# Patient Record
Sex: FEMALE | Race: WHITE | NOT HISPANIC OR LATINO | Employment: UNEMPLOYED | URBAN - METROPOLITAN AREA
[De-identification: names, ages, dates, MRNs, and addresses within clinical notes are randomized per-mention and may not be internally consistent; named-entity substitution may affect disease eponyms.]

---

## 2021-08-04 ENCOUNTER — VBI (OUTPATIENT)
Dept: ADMINISTRATIVE | Facility: OTHER | Age: 56
End: 2021-08-04

## 2021-08-04 NOTE — TELEPHONE ENCOUNTER
Upon review of the In Basket request we were able to locate, review, and update the patient chart as requested for Mammogram and Pap Smear (HPV) aka Cervical Cancer Screening  Any additional questions or concerns should be emailed to the Practice Liaisons via [email or web address] email, please do not reply via In Basket      Thank you  Ethyl Nandini

## 2021-10-05 ENCOUNTER — ANNUAL EXAM (OUTPATIENT)
Dept: OBGYN CLINIC | Facility: CLINIC | Age: 56
End: 2021-10-05
Payer: COMMERCIAL

## 2021-10-05 VITALS
DIASTOLIC BLOOD PRESSURE: 70 MMHG | BODY MASS INDEX: 22.18 KG/M2 | SYSTOLIC BLOOD PRESSURE: 110 MMHG | WEIGHT: 138 LBS | HEIGHT: 66 IN

## 2021-10-05 DIAGNOSIS — Z12.31 ENCOUNTER FOR SCREENING MAMMOGRAM FOR BREAST CANCER: ICD-10-CM

## 2021-10-05 DIAGNOSIS — N95.2 VAGINAL ATROPHY: ICD-10-CM

## 2021-10-05 DIAGNOSIS — Z01.419 ENCOUNTER FOR GYNECOLOGICAL EXAMINATION (GENERAL) (ROUTINE) WITHOUT ABNORMAL FINDINGS: Primary | ICD-10-CM

## 2021-10-05 PROCEDURE — 99396 PREV VISIT EST AGE 40-64: CPT | Performed by: OBSTETRICS & GYNECOLOGY

## 2021-10-05 RX ORDER — ESTRADIOL 0.1 MG/G
1 CREAM VAGINAL 2 TIMES WEEKLY
Qty: 42.5 G | Refills: 1 | Status: SHIPPED | OUTPATIENT
Start: 2021-10-07

## 2022-09-06 DIAGNOSIS — Z12.31 ENCOUNTER FOR SCREENING MAMMOGRAM FOR BREAST CANCER: ICD-10-CM

## 2022-10-19 NOTE — PROGRESS NOTES
Assessment/Plan:    Encounter for gynecological examination (general) (routine) without abnormal findings  Monthly cycles continue every 28 days; flow 2 days; spots 10 days  Spotted most of month in July and August    Last work up 10/2020 with normal u/s and endometrial biopsy  Continues with significant dyspareunia; GI doctor forbade estrogen cream due to liver cysts  At times not painful, then significant burning  Thinks may have to do with pH  Discussed RepHresh gel, option of dilators  Normal breast exam, mammo order given, last 22  Normal pelvic exam with minimal atrophy  Cervix stenotic  Pap done  U/s ordered, RTO 4 wks for possible endometrial biopsy (will need cytotec**)  Colonoscopy 2018, due        Diagnoses and all orders for this visit:    Encounter for gynecological examination (general) (routine) without abnormal findings    Encounter for screening mammogram for malignant neoplasm of breast  -     Mammo screening bilateral w 3d & cad; Future    Screening for malignant neoplasm of the cervix  -     IGP, Aptima HPV, Rfx 16/18,45    Irregular bleeding  -     US pelvis complete w transvaginal; Future          Subjective:      Patient ID: Alex Hodges is a 56 y o  female  HPI Here for well check  The following portions of the patient's history were reviewed and updated as appropriate:   She  has a past medical history of Abnormal ECG (Oct 2018), Abnormal Pap smear of cervix (mid to late 80's), Cancer ([address] ) (,  plus others), Female infertility,  3 para 3, Hemorrhoids, History of endometrial biopsy (10/2020), History of infertility, History of pelvic ultrasound (10/05/2020), Kidney cysts, Liver, polycystic, Low hemoglobin, Migraine, Papanicolaou smear (2018), PCOS (polycystic ovarian syndrome), Polycystic ovary syndrome (mid to late 80's), Skin cancer (melanoma) ([address] ), Subarachnoid cyst, and Varicella    She   Patient Active Problem List    Diagnosis Date Noted   • Liver cyst - NO ESTROGEN 10/20/2022   • Encounter for gynecological examination (general) (routine) without abnormal findings 10/05/2021     She  has a past surgical history that includes Hysteroscopy w/ polypectomy (10/2018); Appendectomy (); Gynecologic cryosurgery ();  section; Tonsillectomy; Excision basal cell carcinoma (); Cosmetic surgery (); Sinus surgery (); Tubal ligation (); Excisional hemorrhoidectomy (); Mammo (historical) (Bilateral, 2021); and Colonoscopy (2018)  Her family history includes Breast cancer (age of onset: 62) in her mother; Cancer in her father; Diabetes in her father; Other in her mother; Ovarian cancer in her mother; Pancreatic cancer in her paternal grandmother; Stroke in her father  She  reports that she has never smoked  She has never used smokeless tobacco  She reports current alcohol use of about 3 0 standard drinks of alcohol per week  She reports that she does not use drugs  No current outpatient medications on file  No current facility-administered medications for this visit  She is allergic to bactrim [sulfamethoxazole-trimethoprim] and sulfa antibiotics       Review of Systems  Continues with prolonged menses  No breast or bowel changes  Some TG   No new persistent pain, bloating, early satiety or pelvic pressure    Objective:      /60 (BP Location: Left arm, Patient Position: Sitting, Cuff Size: Standard)   Ht 5' 6 5" (1 689 m)   Wt 61 kg (134 lb 6 4 oz)   LMP 2022 (Exact Date)   BMI 21 37 kg/m²          Physical Exam    General appearance: no distress, pleasant  Neck: thyroid without nodules or thyromegaly, no palpable adenopathy  Lymph nodes: no palpable adenopathy  Breasts: no masses, nodes or skin changes   2-3 mm mobile cyst @6:00 below areola unchanged for years  Abdomen: soft, nontender unchanged palpable 5 cm epigastric cyst  Pelvic exam: normal external genitalia, urethral meatus normal, vagina without lesions, cervix without lesions, uterus small, non tender, no adnexal masses, non tender  Rectal exam: no masses, non tender, RV confirms above

## 2022-10-20 ENCOUNTER — ANNUAL EXAM (OUTPATIENT)
Dept: OBGYN CLINIC | Facility: CLINIC | Age: 57
End: 2022-10-20

## 2022-10-20 VITALS
SYSTOLIC BLOOD PRESSURE: 105 MMHG | WEIGHT: 134.4 LBS | HEIGHT: 67 IN | DIASTOLIC BLOOD PRESSURE: 60 MMHG | BODY MASS INDEX: 21.09 KG/M2

## 2022-10-20 DIAGNOSIS — N92.6 IRREGULAR BLEEDING: ICD-10-CM

## 2022-10-20 DIAGNOSIS — Z12.31 ENCOUNTER FOR SCREENING MAMMOGRAM FOR MALIGNANT NEOPLASM OF BREAST: ICD-10-CM

## 2022-10-20 DIAGNOSIS — Z12.4 SCREENING FOR MALIGNANT NEOPLASM OF THE CERVIX: ICD-10-CM

## 2022-10-20 DIAGNOSIS — Z01.419 ENCOUNTER FOR GYNECOLOGICAL EXAMINATION (GENERAL) (ROUTINE) WITHOUT ABNORMAL FINDINGS: Primary | ICD-10-CM

## 2022-10-20 PROBLEM — K76.89 LIVER CYST: Status: ACTIVE | Noted: 2022-10-20

## 2022-10-20 NOTE — ASSESSMENT & PLAN NOTE
Monthly cycles continue every 28 days; flow 2 days; spots 10 days  Spotted most of month in July and August    Last work up 10/2020 with normal u/s and endometrial biopsy  Continues with significant dyspareunia; GI doctor forbade estrogen cream due to liver cysts  At times not painful, then significant burning  Thinks may have to do with pH  Discussed RepHresh gel, option of dilators  Normal breast exam, mammo order given, last 8/31/22  Normal pelvic exam with minimal atrophy  Cervix stenotic  Pap done     U/s ordered, RTO 4 wks for possible endometrial biopsy (will need cytotec**)  Colonoscopy 2018, due 2018

## 2022-10-20 NOTE — LETTER
October 20, 2022     Merrill Pickering, [address]  [address] [facility] [place] [address]    Patient: Roscoe Whitney   YOB: 1965   Date of Visit: 10/20/2022       Dear Dr Luca Hidalgo: Thank you for referring Leanne Castro to me for evaluation  Below are my notes for this consultation  If you have questions, please do not hesitate to call me  I look forward to following your patient along with you  Sincerely,        Brian Vickers MD        CC: No Recipients  Brian Vickers MD  10/20/2022  4:24 PM  Sign when Signing Visit  Assessment/Plan:    Encounter for gynecological examination (general) (routine) without abnormal findings  Monthly cycles continue every 28 days; flow 2 days; spots 10 days  Spotted most of month in July and August    Last work up 10/2020 with normal u/s and endometrial biopsy  Continues with significant dyspareunia; GI doctor forbade estrogen cream due to liver cysts  At times not painful, then significant burning  Thinks may have to do with pH  Discussed RepHresh gel, option of dilators  Normal breast exam, mammo order given, last 8/31/22  Normal pelvic exam with minimal atrophy  Cervix stenotic  Pap done  U/s ordered, RTO 4 wks for possible endometrial biopsy (will need cytotec**)  Colonoscopy 2018, due 2018       Diagnoses and all orders for this visit:    Encounter for gynecological examination (general) (routine) without abnormal findings    Encounter for screening mammogram for malignant neoplasm of breast  -     Mammo screening bilateral w 3d & cad; Future    Screening for malignant neoplasm of the cervix  -     IGP, Aptima HPV, Rfx 16/18,45    Irregular bleeding  -     US pelvis complete w transvaginal; Future          Subjective:      Patient ID: Roscoe Whitney is a 56 y o  female  HPI Here for well check        The following portions of the patient's history were reviewed and updated as appropriate:   She  has a past medical history of Abnormal ECG (Oct 2018), Abnormal Pap smear of cervix (mid to late 80's), Cancer ([facility] [facility] ) (,  plus others), Female infertility,  3 para 3, Hemorrhoids, History of endometrial biopsy (10/2020), History of infertility, History of pelvic ultrasound (10/05/2020), Kidney cysts, Liver, polycystic, Low hemoglobin, Migraine, Papanicolaou smear (2018), PCOS (polycystic ovarian syndrome), Polycystic ovary syndrome (mid to late 80's), Skin cancer (melanoma) ([facility] [address] ), Subarachnoid cyst, and Varicella  She   Patient Active Problem List    Diagnosis Date Noted   • Liver cyst - NO ESTROGEN 10/20/2022   • Encounter for gynecological examination (general) (routine) without abnormal findings 10/05/2021     She  has a past surgical history that includes Hysteroscopy w/ polypectomy (10/2018); Appendectomy (); Gynecologic cryosurgery ();  section; Tonsillectomy; Excision basal cell carcinoma (); Cosmetic surgery (); Sinus surgery (); Tubal ligation (); Excisional hemorrhoidectomy (); Mammo (historical) (Bilateral, 2021); and Colonoscopy (2018)  Her family history includes Breast cancer (age of onset: 62) in her mother; Cancer in her father; Diabetes in her father; Other in her mother; Ovarian cancer in her mother; Pancreatic cancer in her paternal grandmother; Stroke in her father  She  reports that she has never smoked  She has never used smokeless tobacco  She reports current alcohol use of about 3 0 standard drinks of alcohol per week  She reports that she does not use drugs  No current outpatient medications on file  No current facility-administered medications for this visit  She is allergic to bactrim [sulfamethoxazole-trimethoprim] and sulfa antibiotics       Review of Systems  Continues with prolonged menses  No breast or bowel changes     Some TG   No new persistent pain, bloating, early satiety or pelvic pressure    Objective:      /60 (BP Location: Left arm, Patient Position: Sitting, Cuff Size: Standard)   Ht 5' 6 5" (1 689 m)   Wt 61 kg (134 lb 6 4 oz)   LMP 09/27/2022 (Exact Date)   BMI 21 37 kg/m²          Physical Exam    General appearance: no distress, pleasant  Neck: thyroid without nodules or thyromegaly, no palpable adenopathy  Lymph nodes: no palpable adenopathy  Breasts: no masses, nodes or skin changes   2-3 mm mobile cyst @6:00 below areola unchanged for years  Abdomen: soft, nontender unchanged palpable 5 cm epigastric cyst  Pelvic exam: normal external genitalia, urethral meatus normal, vagina without lesions, cervix without lesions, uterus small, non tender, no adnexal masses, non tender  Rectal exam: no masses, non tender, RV confirms above

## 2022-10-20 NOTE — PATIENT INSTRUCTIONS
Return to office in one year unless having any problems such as breast changes, bleeding, new persistent pain, new progressive bloating, new problems eating (getting full to quickly) or new constant urinary pressure that does not resolve in one week  Call in six months to schedule your annual visit      Try RepHresh gel for the pH correction  Uberlube is available on line

## 2022-10-24 ENCOUNTER — TELEPHONE (OUTPATIENT)
Dept: OBGYN CLINIC | Facility: CLINIC | Age: 57
End: 2022-10-24

## 2022-10-24 NOTE — TELEPHONE ENCOUNTER
Sandee mahan requesting call back with when she is to schedule u/s  Return call to [facility] who states she is scheduled for Wednesday  She started her menses on Saturday so she will be on day 5 of cycle  Advised typically recommend mid cycle day 8-12  She would prefer to keep scheduled for Wednesday if possible due to upcoming schedule conflicts  Advised ok to proceed scheduled

## 2022-10-25 LAB
CYTOLOGIST CVX/VAG CYTO: NORMAL
DX ICD CODE: NORMAL
HPV I/H RISK 4 DNA CVX QL PROBE+SIG AMP: NEGATIVE
OTHER STN SPEC: NORMAL
PATH REPORT.FINAL DX SPEC: NORMAL
SL AMB NOTE:: NORMAL
SL AMB SPECIMEN ADEQUACY: NORMAL
SL AMB TEST METHODOLOGY: NORMAL

## 2022-11-01 ENCOUNTER — TELEPHONE (OUTPATIENT)
Dept: OBGYN CLINIC | Facility: CLINIC | Age: 57
End: 2022-11-01

## 2022-11-01 DIAGNOSIS — N83.292 OTHER OVARIAN CYST, LEFT SIDE: Primary | ICD-10-CM

## 2022-11-01 NOTE — TELEPHONE ENCOUNTER
Patient returned call  She does not have her log in information for [facility] to review Dr Baptiste Rape message  Advised her of Dr Baptiste Rape message with the ultrasounds results and recommendations  She request the script to be mailed to her home as she will be away  She is going to board the plane around after 11 am today and she will be aware for most of the Winter but will be back in town around 01/10/23 and will repeat the ultrasound around that time  She have a question for Dr Inocente Maguire in regards to the endometrial biopsy if Dr Inocente Maguire still want to proceed with that or not  Dr Inocente Maguire please review

## 2022-11-01 NOTE — TELEPHONE ENCOUNTER
Mychart message left with ultrasound results and recommendations for f/u in 3 months  Order placed, please get order to pt for GV u/s in Feb    Thanks

## 2022-11-03 NOTE — TELEPHONE ENCOUNTER
Called and spoke with Sandee, reviewed Dr Urmila Mcneal recommendation to continue monitoring bleeding, if increases or pattern change patient is to call back, otherwise she will follow up with US in 3 months as previously discussed

## 2022-11-03 NOTE — TELEPHONE ENCOUNTER
As the lining was quite thin at 2 mm (less than 4 is normal), I do not need to biopsy unless the bleeding pattern is concerning  Last time Sandee reported 2 days of menses with 10 days of spotting  If that increases we should consider a biopsy at that time  Thanks

## 2023-01-18 ENCOUNTER — PATIENT MESSAGE (OUTPATIENT)
Dept: OBGYN CLINIC | Facility: CLINIC | Age: 58
End: 2023-01-18

## 2023-01-18 DIAGNOSIS — D39.12 NEOPLASM OF UNCERTAIN BEHAVIOR OF LEFT OVARY: Primary | ICD-10-CM

## 2023-01-25 LAB
CANCER AG125 SERPL-ACNC: 10.8 U/ML (ref 0–38.1)
CREAT SERPL-MCNC: 0.74 MG/DL (ref 0.57–1)
EGFR: 95 ML/MIN/1.73

## 2023-02-02 ENCOUNTER — PATIENT MESSAGE (OUTPATIENT)
Dept: OBGYN CLINIC | Facility: CLINIC | Age: 58
End: 2023-02-02

## 2023-02-02 DIAGNOSIS — N83.292 OTHER OVARIAN CYST, LEFT SIDE: Primary | ICD-10-CM

## 2023-02-02 DIAGNOSIS — N88.2 CERVICAL OS STENOSIS: ICD-10-CM

## 2023-02-06 RX ORDER — MISOPROSTOL 100 UG/1
TABLET ORAL
Qty: 1 TABLET | Refills: 0 | Status: SHIPPED | OUTPATIENT
Start: 2023-02-06

## 2023-04-25 ENCOUNTER — PROCEDURE VISIT (OUTPATIENT)
Dept: OBGYN CLINIC | Facility: CLINIC | Age: 58
End: 2023-04-25

## 2023-04-25 VITALS
HEIGHT: 66 IN | SYSTOLIC BLOOD PRESSURE: 120 MMHG | DIASTOLIC BLOOD PRESSURE: 64 MMHG | BODY MASS INDEX: 22.05 KG/M2 | WEIGHT: 137.2 LBS

## 2023-04-25 DIAGNOSIS — N92.6 IRREGULAR BLEEDING: Primary | ICD-10-CM

## 2023-04-25 DIAGNOSIS — N83.292 OTHER OVARIAN CYST, LEFT SIDE: ICD-10-CM

## 2023-04-25 NOTE — ASSESSMENT & PLAN NOTE
Continues with monthly periods and prolonged spotting  Imaging reviewed  Endo bx with ease  Would like to avoid hormonal management due to liver cysts  Will contact with results  RTO well check in October

## 2023-04-25 NOTE — ASSESSMENT & PLAN NOTE
Resolved left cyst on last u/s  Small right hemorrhagic cyst noted  No follow up unless otherwise indicated

## 2023-04-25 NOTE — PROGRESS NOTES
Assessment/Plan:    Irregular bleeding  Continues with monthly periods and prolonged spotting  Imaging reviewed  Endo bx with ease  Would like to avoid hormonal management due to liver cysts  Will contact with results  RTO well check in October  Other ovarian cyst, left side  Resolved left cyst on last u/s  Small right hemorrhagic cyst noted  No follow up unless otherwise indicated  Diagnoses and all orders for this visit:    Irregular bleeding  -     Histology Specimen    Other ovarian cyst, left side    Other orders  -     Endometrial biopsy          Subjective:      Patient ID: Calvin Womack is a 56 y o  female  HPI Two chronic issues with prolonged bleeding and ovarian cysts  Reviewed u/s from 2023, MRI from 2023 and last u/s from 2023  Normal  reviewed  The following portions of the patient's history were reviewed and updated as appropriate:   She  has a past medical history of Abnormal ECG (Oct 2018), Abnormal Pap smear of cervix (mid to late 80's), Ankle fracture (2023), Cancer ([address] ) (,  plus others), Concussion (2023), Female infertility, Hemorrhoids, History of endometrial biopsy (10/2020), History of infertility, History of pelvic ultrasound (10/05/2020), Kidney cysts, Liver, polycystic, Low hemoglobin, Migraine, Polycystic ovary syndrome (mid to late 80's), Skin cancer (melanoma) ([facility] [address] ), Subarachnoid cyst, and Varicella  She   Patient Active Problem List    Diagnosis Date Noted   • Irregular bleeding 2023   • Other ovarian cyst, left side 2023   • Liver cyst - NO ESTROGEN 10/20/2022   • Encounter for gynecological examination (general) (routine) without abnormal findings 10/05/2021     She  has a past surgical history that includes Hysteroscopy w/ polypectomy (10/2018); Appendectomy (); Gynecologic cryosurgery ();  section; Tonsillectomy; Excision basal cell carcinoma (); Cosmetic surgery ();  Sinus surgery "(2000); Tubal ligation (2004); Excisional hemorrhoidectomy (2019); Mammo (historical) (Bilateral, 08/30/2021); and Colonoscopy (03/2018)  Her family history includes Breast cancer (age of onset: 62) in her mother; Cancer in her father; Diabetes in her father; Other in her mother; Ovarian cancer in her mother; Pancreatic cancer in her paternal grandmother; Stroke in her father  She  reports that she has never smoked  She has never used smokeless tobacco  She reports current alcohol use of about 3 0 standard drinks per week  She reports that she does not use drugs  Current Outpatient Medications   Medication Sig Dispense Refill   • miSOPROStol (Cytotec) 100 mcg tablet 1/2 tablet in the vagina the night prior to the procedure  Repeat the morning of the procedure with the other half  1 tablet 0     No current facility-administered medications for this visit  She is allergic to bactrim [sulfamethoxazole-trimethoprim] and sulfa antibiotics       Review of Systems  see above    Objective:      /64   Ht 5' 6\" (1 676 m)   Wt 62 2 kg (137 lb 3 2 oz)   LMP 04/19/2023 (Exact Date)   Breastfeeding No   BMI 22 14 kg/m²          Physical Exam    Appears well, no apparent distress  Does not appear anxious or depressed  Pelvic exam: normal external genitalia, vagina normal no abnormal discharge, cervix without lesions, dark bloody discharge noted, cervical stenosis  Endometrial biopsy    Date/Time: 4/25/2023 10:48 AM  Performed by: Jeremías Hernandez MD  Authorized by: Jeremías Hernandez MD   Universal Protocol:  Consent: Verbal consent obtained  Written consent obtained    Risks and benefits: risks, benefits and alternatives were discussed  Consent given by: patient  Patient understanding: patient states understanding of the procedure being performed  Patient consent: the patient's understanding of the procedure matches consent given  Relevant documents: relevant documents present and verified  Patient identity confirmed: " verbally with patient      Indication:     Indications: Other disorder of menstruation and other abnormal bleeding from female genital tract    Pre-procedure:     Anesthesia premedication: cytotec  Procedure:     Procedure: endometrial biopsy with Pipelle      A bivalve speculum was placed in the vagina: yes      Cervix cleaned and prepped: yes      A paracervical block was performed: no      The cervix was dilated: yes      Uterus sounded: yes      Uterus sound depth (cm):  8 5    Specimen collected: specimen collected and sent to pathology      Patient tolerated procedure well with no complications: yes    Findings:     Uterus size:  Non-gravid    Cervix: normal    Comments:     Procedure comments:  Endo bx with ease after dilators and pre-procedure cytotec  Data:   3/14/23 u/s with EMS 9 mm; R ov 1 5 cm with complex cyst (favor hemorrhagic); L ov with simple 13mm cyst  Resolved complex lesion  No free fluid     1/31/23 MRI with and without contrast   Uterus 9 5 cm with 10 mm fibroid  EMS 6 8 mm  Right ovary with subcm cysts  Left complicated cyst 2 4 cm no abnormal enhancement, min free pelvic fluid  Recommend follow up with u/s or MRI    1/16/23 u/s: Uterus 8 6 cm with 7 mm EMS  R ov 2 4 and L ov 2 6 cm with 1 8 cm follicle       1/24/23 ca-125: 10 8

## 2023-04-25 NOTE — LETTER
April 25, 2023     Sanjuanita Hernandez, [address]  [address] [place] [address]    Patient: Molina Leroy   YOB: 1965   Date of Visit: 4/25/2023       Dear Dr Vanessa Souza: Thank you for referring Zackary Gastelum to me for evaluation  Below are my notes for this consultation  If you have questions, please do not hesitate to call me  I look forward to following your patient along with you  Sincerely,        Destin Polk MD        CC: No Recipients  Destin Polk MD  4/25/2023 10:51 AM  Sign when Signing Visit  Assessment/Plan:    Irregular bleeding  Continues with monthly periods and prolonged spotting  Imaging reviewed  Endo bx with ease  Would like to avoid hormonal management due to liver cysts  Will contact with results  RTO well check in October  Other ovarian cyst, left side  Resolved left cyst on last u/s  Small right hemorrhagic cyst noted  No follow up unless otherwise indicated  Diagnoses and all orders for this visit:    Irregular bleeding  -     Histology Specimen    Other ovarian cyst, left side    Other orders  -     Endometrial biopsy         Subjective:     Patient ID: Molina Leroy is a 56 y o  female  HPI Two chronic issues with prolonged bleeding and ovarian cysts  Reviewed u/s from 1/2023, MRI from 1/2023 and last u/s from 4/2023  Normal  reviewed      The following portions of the patient's history were reviewed and updated as appropriate:   She  has a past medical history of Abnormal ECG (Oct 2018), Abnormal Pap smear of cervix (mid to late 80's), Ankle fracture (03/05/2023), Cancer ([address] ) (4/09, 12/12 plus others), Concussion (03/05/2023), Female infertility, Hemorrhoids, History of endometrial biopsy (10/2020), History of infertility, History of pelvic ultrasound (10/05/2020), Kidney cysts, Liver, polycystic, Low hemoglobin, Migraine, Polycystic ovary syndrome (mid to late 80's), Skin cancer (melanoma) ([address] ), Subarachnoid "cyst, and Varicella  She   Patient Active Problem List    Diagnosis Date Noted   • Irregular bleeding 2023   • Other ovarian cyst, left side 2023   • Liver cyst - NO ESTROGEN 10/20/2022   • Encounter for gynecological examination (general) (routine) without abnormal findings 10/05/2021     She  has a past surgical history that includes Hysteroscopy w/ polypectomy (10/2018); Appendectomy (); Gynecologic cryosurgery ();  section; Tonsillectomy; Excision basal cell carcinoma (); Cosmetic surgery (); Sinus surgery (); Tubal ligation (); Excisional hemorrhoidectomy (); Mammo (historical) (Bilateral, 2021); and Colonoscopy (2018)  Her family history includes Breast cancer (age of onset: 62) in her mother; Cancer in her father; Diabetes in her father; Other in her mother; Ovarian cancer in her mother; Pancreatic cancer in her paternal grandmother; Stroke in her father  She  reports that she has never smoked  She has never used smokeless tobacco  She reports current alcohol use of about 3 0 standard drinks per week  She reports that she does not use drugs  Current Outpatient Medications   Medication Sig Dispense Refill   • miSOPROStol (Cytotec) 100 mcg tablet 1/2 tablet in the vagina the night prior to the procedure  Repeat the morning of the procedure with the other half  1 tablet 0     No current facility-administered medications for this visit  She is allergic to bactrim [sulfamethoxazole-trimethoprim] and sulfa antibiotics       Review of Systems see above    Objective:      /64   Ht 5' 6\" (1 676 m)   Wt 62 2 kg (137 lb 3 2 oz)   LMP 2023 (Exact Date)   Breastfeeding No   BMI 22 14 kg/m²         Physical Exam   Appears well, no apparent distress  Does not appear anxious or depressed  Pelvic exam: normal external genitalia, vagina normal no abnormal discharge, cervix without lesions, dark bloody discharge noted, cervical stenosis   " Endometrial biopsy    Date/Time: 4/25/2023 10:48 AM  Performed by: Karla Gupta MD  Authorized by: Karla Gupta MD   Universal Protocol:  Consent: Verbal consent obtained  Written consent obtained  Risks and benefits: risks, benefits and alternatives were discussed  Consent given by: patient  Patient understanding: patient states understanding of the procedure being performed  Patient consent: the patient's understanding of the procedure matches consent given  Relevant documents: relevant documents present and verified  Patient identity confirmed: verbally with patient      Indication:     Indications: Other disorder of menstruation and other abnormal bleeding from female genital tract    Pre-procedure:     Anesthesia premedication: cytotec  Procedure:     Procedure: endometrial biopsy with Pipelle      A bivalve speculum was placed in the vagina: yes      Cervix cleaned and prepped: yes      A paracervical block was performed: no      The cervix was dilated: yes      Uterus sounded: yes      Uterus sound depth (cm):  8 5    Specimen collected: specimen collected and sent to pathology      Patient tolerated procedure well with no complications: yes    Findings:     Uterus size:  Non-gravid    Cervix: normal    Comments:     Procedure comments:  Endo bx with ease after dilators and pre-procedure cytotec  Data:   3/14/23 u/s with EMS 9 mm; R ov 1 5 cm with complex cyst (favor hemorrhagic); L ov with simple 13mm cyst  Resolved complex lesion  No free fluid     1/31/23 MRI with and without contrast   Uterus 9 5 cm with 10 mm fibroid  EMS 6 8 mm  Right ovary with subcm cysts  Left complicated cyst 2 4 cm no abnormal enhancement, min free pelvic fluid  Recommend follow up with u/s or MRI    1/16/23 u/s: Uterus 8 6 cm with 7 mm EMS  R ov 2 4 and L ov 2 6 cm with 1 8 cm follicle       1/24/23 ca-125: 10 8

## 2023-05-02 LAB
PATH REPORT.SITE OF ORIGIN SPEC: NORMAL
PAYMENT PROCEDURE: NORMAL
SL AMB .: NORMAL

## 2023-09-21 DIAGNOSIS — Z12.31 ENCOUNTER FOR SCREENING MAMMOGRAM FOR MALIGNANT NEOPLASM OF BREAST: ICD-10-CM

## 2023-09-25 NOTE — PROGRESS NOTES
Completed 21. Assessment/Plan:    Encounter for gynecological examination (general) (routine) without abnormal findings  Here for well check, LMP 23. Having hot flashes. Normal breast and pelvic exams. Last pap 10/2022 neg/HPV neg; due   Mammo order given, last 23  Colonoscopy 3/2018, due   Cycle warnings given  May be interested in genetic testing due to family history, orders given       Diagnoses and all orders for this visit:    Encounter for gynecological examination (general) (routine) without abnormal findings    Encounter for screening mammogram for breast cancer  -     Mammo screening bilateral w 3d & cad; Future    Family history of pancreatic cancer  -     Ambulatory Referral to Oncology Genetics; Future    Other orders  -     Liquid-based pap, screening          Subjective:      Patient ID: Everton Rojo is a 66 y.o. female. HPI Here for well check. The following portions of the patient's history were reviewed and updated as appropriate:   She  has a past medical history of Abnormal ECG (Oct 2018), Abnormal Pap smear of cervix (mid to late 80's), Ankle fracture (2023), Cancer ([address]) (,  plus others), Concussion (2023), Female infertility, Hemorrhoids, History of endometrial biopsy (10/2020), History of infertility, History of pelvic ultrasound (10/05/2020), Kidney cysts, Liver, polycystic, Low hemoglobin, Migraine, Polycystic ovary syndrome (mid to late 80's), Skin cancer (melanoma) ([address]), Subarachnoid cyst, and Varicella. She   Patient Active Problem List    Diagnosis Date Noted   • Liver cyst - NO ESTROGEN 10/20/2022   • Encounter for gynecological examination (general) (routine) without abnormal findings 10/05/2021     She  has a past surgical history that includes Hysteroscopy w/ polypectomy (10/2018); Appendectomy (); Gynecologic cryosurgery ();  section; Tonsillectomy; Excision basal cell carcinoma ();  Cosmetic surgery (2009); Sinus surgery (2000); Tubal ligation (2004); Excisional hemorrhoidectomy (2019); Mammo (historical) (Bilateral, 08/30/2021); and Colonoscopy (03/2018). Her family history includes Breast cancer (age of onset: 62) in her mother; Diabetes in her father and paternal grandmother; No Known Problems in her maternal uncle, maternal uncle, and maternal uncle; Obesity in her paternal grandmother; Other in her brother, father, and mother; Pancreatic cancer in her paternal grandmother; Stroke in her father. She  reports that she has never smoked. She has never used smokeless tobacco. She reports current alcohol use of about 3.0 standard drinks of alcohol per week. She reports that she does not use drugs. No current outpatient medications on file. No current facility-administered medications for this visit. She is allergic to bactrim [sulfamethoxazole-trimethoprim] and sulfa antibiotics. .    Review of Systems  No breast, bladder, bowel changes.  No new persistent pain, bloating, early satiety or pelvic pressure      Objective:      /66   Ht 5' 6" (1.676 m)   Wt 61.7 kg (136 lb)   LMP 09/12/2023 (Exact Date) Comment: spotted x 2 days  BMI 21.95 kg/m²          Physical Exam    General appearance: no distress, pleasant  Neck: thyroid without nodules or thyromegaly, no palpable adenopathy  Lymph nodes: no palpable adenopathy  Breasts: no masses, nodes or skin changes  Abdomen: soft, non tender, unchanged epigastric cyst, nontender  Pelvic exam: normal external genitalia, urethral meatus normal, vagina without lesions, cervix without lesions, uterus small, non tender, no adnexal masses, non tender  Rectal exam: no masses, non tender, RV confirms above

## 2023-09-26 ENCOUNTER — ANNUAL EXAM (OUTPATIENT)
Dept: OBGYN CLINIC | Facility: CLINIC | Age: 58
End: 2023-09-26
Payer: COMMERCIAL

## 2023-09-26 VITALS
DIASTOLIC BLOOD PRESSURE: 66 MMHG | WEIGHT: 136 LBS | BODY MASS INDEX: 21.86 KG/M2 | HEIGHT: 66 IN | SYSTOLIC BLOOD PRESSURE: 108 MMHG

## 2023-09-26 DIAGNOSIS — Z01.419 ENCOUNTER FOR GYNECOLOGICAL EXAMINATION (GENERAL) (ROUTINE) WITHOUT ABNORMAL FINDINGS: Primary | ICD-10-CM

## 2023-09-26 DIAGNOSIS — Z12.31 ENCOUNTER FOR SCREENING MAMMOGRAM FOR BREAST CANCER: ICD-10-CM

## 2023-09-26 DIAGNOSIS — Z80.0 FAMILY HISTORY OF PANCREATIC CANCER: ICD-10-CM

## 2023-09-26 PROBLEM — N92.6 IRREGULAR BLEEDING: Status: RESOLVED | Noted: 2023-04-25 | Resolved: 2023-09-26

## 2023-09-26 PROBLEM — N83.292 OTHER OVARIAN CYST, LEFT SIDE: Status: RESOLVED | Noted: 2023-04-25 | Resolved: 2023-09-26

## 2023-09-26 PROCEDURE — S0612 ANNUAL GYNECOLOGICAL EXAMINA: HCPCS | Performed by: OBSTETRICS & GYNECOLOGY

## 2023-09-26 NOTE — LETTER
September 26, 2023     Roger Bray[address]    Patient: Kathrine Álvarez   YOB: 1965   Date of Visit: 9/26/2023       Dear Dr. King Old:    Veronica Mart was in today for her annual gyn exam. Below are my notes for this consultation. If you have questions, please do not hesitate to call me. I look forward to following your patient along with you. Sincerely,        Andrew Barba MD        CC: No Recipients    Andrew Barba MD  9/26/2023 11:25 AM  Sign when Signing Visit  Completed 8/30/21. Assessment/Plan:    Encounter for gynecological examination (general) (routine) without abnormal findings  Here for well check, LMP 9/12/23. Having hot flashes. Normal breast and pelvic exams. Last pap 10/2022 neg/HPV neg; due 2027  Mammo order given, last 9/11/23  Colonoscopy 3/2018, due 2028  Cycle warnings given  May be interested in genetic testing due to family history, orders given      Diagnoses and all orders for this visit:    Encounter for gynecological examination (general) (routine) without abnormal findings    Encounter for screening mammogram for breast cancer  -     Mammo screening bilateral w 3d & cad; Future    Family history of pancreatic cancer  -     Ambulatory Referral to Oncology Genetics; Future    Other orders  -     Liquid-based pap, screening         Subjective:     Patient ID: Kathrine Álvarez is a 66 y.o. female. HPI Here for well check.     The following portions of the patient's history were reviewed and updated as appropriate:   She  has a past medical history of Abnormal ECG (Oct 2018), Abnormal Pap smear of cervix (mid to late 80's), Ankle fracture (03/05/2023), Cancer ([address] [facility]) (4/09, 12/12 plus others), Concussion (03/05/2023), Female infertility, Hemorrhoids, History of endometrial biopsy (10/2020), History of infertility, History of pelvic ultrasound (10/05/2020), Kidney cysts, Liver, polycystic, Low hemoglobin, Migraine, Polycystic ovary syndrome (mid to late 80's), Skin cancer (melanoma) ([address]), Subarachnoid cyst, and Varicella. She   Patient Active Problem List    Diagnosis Date Noted   • Liver cyst - NO ESTROGEN 10/20/2022   • Encounter for gynecological examination (general) (routine) without abnormal findings 10/05/2021     She  has a past surgical history that includes Hysteroscopy w/ polypectomy (10/2018); Appendectomy (); Gynecologic cryosurgery ();  section; Tonsillectomy; Excision basal cell carcinoma (); Cosmetic surgery (); Sinus surgery (); Tubal ligation (); Excisional hemorrhoidectomy (); Mammo (historical) (Bilateral, 2021); and Colonoscopy (2018). Her family history includes Breast cancer (age of onset: 62) in her mother; Diabetes in her father and paternal grandmother; No Known Problems in her maternal uncle, maternal uncle, and maternal uncle; Obesity in her paternal grandmother; Other in her brother, father, and mother; Pancreatic cancer in her paternal grandmother; Stroke in her father. She  reports that she has never smoked. She has never used smokeless tobacco. She reports current alcohol use of about 3.0 standard drinks of alcohol per week. She reports that she does not use drugs. No current outpatient medications on file. No current facility-administered medications for this visit. She is allergic to bactrim [sulfamethoxazole-trimethoprim] and sulfa antibiotics. .    Review of Systems No breast, bladder, bowel changes.  No new persistent pain, bloating, early satiety or pelvic pressure      Objective:      /66   Ht 5' 6" (1.676 m)   Wt 61.7 kg (136 lb)   LMP 2023 (Exact Date) Comment: spotted x 2 days  BMI 21.95 kg/m²         Physical Exam   General appearance: no distress, pleasant  Neck: thyroid without nodules or thyromegaly, no palpable adenopathy  Lymph nodes: no palpable adenopathy  Breasts: no masses, nodes or skin changes  Abdomen: soft, non tender, unchanged epigastric cyst, nontender  Pelvic exam: normal external genitalia, urethral meatus normal, vagina without lesions, cervix without lesions, uterus small, non tender, no adnexal masses, non tender  Rectal exam: no masses, non tender, RV confirms above

## 2023-09-26 NOTE — ASSESSMENT & PLAN NOTE
Here for well check, LMP 9/12/23. Having hot flashes. Normal breast and pelvic exams.   Last pap 10/2022 neg/HPV neg; due 2027  Mammo order given, last 9/11/23  Colonoscopy 3/2018, due 2028  Cycle warnings given  May be interested in genetic testing due to family history, orders given

## 2023-09-28 ENCOUNTER — TELEPHONE (OUTPATIENT)
Dept: HEMATOLOGY ONCOLOGY | Facility: CLINIC | Age: 58
End: 2023-09-28

## 2023-09-28 NOTE — TELEPHONE ENCOUNTER
I called Niru Haskins in response to a referral that was received for patient to establish care with [facility]. Outreach was made to schedule a consultation. I left a voicemail explaining the reason for my call and advised patient to call [facility] at [phone]. The referral has been closed.

## 2024-02-21 PROBLEM — Z01.419 ENCOUNTER FOR GYNECOLOGICAL EXAMINATION (GENERAL) (ROUTINE) WITHOUT ABNORMAL FINDINGS: Status: RESOLVED | Noted: 2021-10-05 | Resolved: 2024-02-21

## 2024-09-13 DIAGNOSIS — Z12.31 ENCOUNTER FOR SCREENING MAMMOGRAM FOR BREAST CANCER: ICD-10-CM

## 2024-10-07 NOTE — PROGRESS NOTES
Assessment/Plan:    Encounter for gynecological examination (general) (routine) without abnormal findings  Here for well check, has been skipping menses up to 3 months. Spotted for the month of July, very light showing after void x 3 days 9/25-9/28/24.   Notes temperature instability, sleep disturbances and emotional lability.    Normal breast and pelvic exams.  Last pap 10/2022 neg/HPV neg; due 2027  Mammo order given, last 9/12/24 BIRADS 1C  Colonoscopy 3/2018, due 2028  Will check u/s and contact with results. Will need endo bx if EMS >4 mm (last 4/2023).     Had genetic testing collected 9/17/24 with [facility] breast surgery clinic, results pending. Agrees to send report to us.     Diagnoses and all orders for this visit:    Encounter for screening mammogram for breast cancer  -     Mammo screening bilateral w 3d and cad; Future    Encounter for gynecological examination (general) (routine) without abnormal findings    Irregular bleeding  -     US pelvis complete w transvaginal; Future    Other orders  -     METRONIDAZOLE, TOPICAL, 0.75 % LOTN; APPLY TO FACE ONCE DAILY        Subjective:      Patient ID: Breanne Lee is a 59 y.o. female.    HPI Here for well check.    The following portions of the patient's history were reviewed and updated as appropriate: She  has a past medical history of Abnormal ECG (Oct 2018), Abnormal Pap smear of cervix (mid to late 80's), Ankle fracture (03/05/2023), Cancer (HCC) (4/09, 12/12 plus others), Concussion (03/05/2023), Female infertility, Hemorrhoids, History of endometrial biopsy (10/2020), History of infertility, History of pelvic ultrasound (10/05/2020), Kidney cysts, Liver, polycystic, Low hemoglobin, Migraine, Polycystic ovary syndrome (mid to late 80's), Skin cancer (melanoma) (HCC), Subarachnoid cyst, and Varicella.  She   Patient Active Problem List    Diagnosis Date Noted    Liver cyst - NO ESTROGEN 10/20/2022     She  has a past surgical history that includes  "Hysteroscopy w/ polypectomy (10/2018); Appendectomy (); Gynecologic cryosurgery ();  section; Tonsillectomy; Excision basal cell carcinoma (); Cosmetic surgery (); Sinus surgery (); Tubal ligation (); Excisional hemorrhoidectomy (); Mammo (historical) (Bilateral, 2021); and Colonoscopy (2018).  Her family history includes Breast cancer (age of onset: 58) in her mother; Cancer in her father; Dementia in her maternal grandmother; Diabetes in her father and paternal grandmother; Kidney disease in her father; Learning disabilities in her paternal uncle; No Known Problems in her brother, brother, child, daughter, maternal uncle, maternal uncle, maternal uncle, paternal grandfather, and son; Obesity in her paternal grandmother; Other in her father and mother; Pancreatic cancer in her paternal grandmother; Skin cancer in her maternal grandfather; Stroke in her father.  She  reports that she has never smoked. She has never used smokeless tobacco. She reports current alcohol use of about 3.0 standard drinks of alcohol per week. She reports that she does not use drugs.  Current Outpatient Medications   Medication Sig Dispense Refill    METRONIDAZOLE, TOPICAL, 0.75 % LOTN APPLY TO FACE ONCE DAILY       No current facility-administered medications for this visit.     She is allergic to bactrim [sulfamethoxazole-trimethoprim] and sulfa antibiotics..    Review of Systems  No breast, bladder, bowel changes. No new persistent pain, bloating, early satiety or pelvic pressure  +spotting for the month of 2024  +temperature instability and mood lability    Objective:    BP 96/62 (BP Location: Left arm, Patient Position: Sitting, Cuff Size: Standard)   Ht 5' 6.75\" (1.695 m)   Wt 62.9 kg (138 lb 9.6 oz)   LMP 2024 (Approximate)   BMI 21.87 kg/m²      Physical Exam    General appearance: no distress, pleasant  Neck: thyroid without nodules or thyromegaly, no palpable " adenopathy  Lymph nodes: no palpable adenopathy  Breasts: no masses, nodes or skin changes  Abdomen: soft, non tender, prominent liver edge as in past, no palpable masses  Pelvic exam: normal external genitalia, urethral meatus normal, vagina without lesions, cervix without lesions, uterus small, non tender, no adnexal masses, non tender  Rectal exam: deferred

## 2024-10-08 ENCOUNTER — ANNUAL EXAM (OUTPATIENT)
Dept: OBGYN CLINIC | Facility: CLINIC | Age: 59
End: 2024-10-08
Payer: COMMERCIAL

## 2024-10-08 VITALS
BODY MASS INDEX: 21.75 KG/M2 | SYSTOLIC BLOOD PRESSURE: 96 MMHG | DIASTOLIC BLOOD PRESSURE: 62 MMHG | HEIGHT: 67 IN | WEIGHT: 138.6 LBS

## 2024-10-08 DIAGNOSIS — Z01.419 ENCOUNTER FOR GYNECOLOGICAL EXAMINATION (GENERAL) (ROUTINE) WITHOUT ABNORMAL FINDINGS: Primary | ICD-10-CM

## 2024-10-08 DIAGNOSIS — N92.6 IRREGULAR BLEEDING: ICD-10-CM

## 2024-10-08 DIAGNOSIS — Z12.31 ENCOUNTER FOR SCREENING MAMMOGRAM FOR BREAST CANCER: ICD-10-CM

## 2024-10-08 PROCEDURE — S0612 ANNUAL GYNECOLOGICAL EXAMINA: HCPCS | Performed by: OBSTETRICS & GYNECOLOGY

## 2024-10-08 RX ORDER — METRONIDAZOLE 7.5 MG/G
LOTION TOPICAL
COMMUNITY
Start: 2024-08-27

## 2024-10-08 NOTE — PATIENT INSTRUCTIONS
Please call the office if you do not hear about your results in 10-14 days.     Return to office in one year unless having any problems such as breast changes, bleeding, new persistent pain, new progressive bloating, new problems eating (getting full to quickly) or new constant urinary pressure that does not resolve in one week.    Upload your genetic report to us if possible when received.    Call for bleeding that occurs earlier than 21 days, lasts longer than 10 days or for bleeding between cycles.

## 2024-10-08 NOTE — ASSESSMENT & PLAN NOTE
Here for well check, has been skipping menses up to 3 months. Spotted for the month of July, very light showing after void x 3 days 9/25-9/28/24.   Notes temperature instability, sleep disturbances and emotional lability.    Normal breast and pelvic exams.  Last pap 10/2022 neg/HPV neg; due 2027  Mammo order given, last 9/12/24 BIRADS 1C  Colonoscopy 3/2018, due 2028  Will check u/s and contact with results. Will need endo bx if EMS >4 mm (last 4/2023).     Had genetic testing collected 9/17/24 with [facility] breast surgery clinic, results pending. Agrees to send report to us.

## 2024-10-08 NOTE — LETTER
October 8, 2024     Nancy Amador, DO  [address]  [address]  [place] [address]    Patient: Breanne Lee   YOB: 1965   Date of Visit: 10/8/2024       Dear Dr. Amador:    Breanne Lee was in today for her annual gyn exam. Below are my notes for this visit.    If you have questions, please do not hesitate to call me. I look forward to following your patient along with you.         Sincerely,        Kailey Lucia MD        CC: No Recipients    Kailey Lucia MD  10/8/2024  1:52 PM  Sign when Signing Visit  Assessment/Plan:    Encounter for gynecological examination (general) (routine) without abnormal findings  Here for well check, has been skipping menses up to 3 months. Spotted for the month of July, very light showing after void x 3 days 9/25-9/28/24.   Notes temperature instability, sleep disturbances and emotional lability.    Normal breast and pelvic exams.  Last pap 10/2022 neg/HPV neg; due 2027  Mammo order given, last 9/12/24 BIRADS 1C  Colonoscopy 3/2018, due 2028  Will check u/s and contact with results. Will need endo bx if EMS >4 mm (last 4/2023).     Had genetic testing collected 9/17/24 with [facility] breast surgery clinic, results pending. Agrees to send report to us.     Diagnoses and all orders for this visit:    Encounter for screening mammogram for breast cancer  -     Mammo screening bilateral w 3d and cad; Future    Encounter for gynecological examination (general) (routine) without abnormal findings    Irregular bleeding  -     US pelvis complete w transvaginal; Future    Other orders  -     METRONIDAZOLE, TOPICAL, 0.75 % LOTN; APPLY TO FACE ONCE DAILY        Subjective:      Patient ID: Breanne Lee is a 59 y.o. female.    HPI Here for well check.    The following portions of the patient's history were reviewed and updated as appropriate: She  has a past medical history of Abnormal ECG (Oct 2018), Abnormal Pap smear of cervix (mid to late 80's), Ankle fracture  (2023), Cancer (HCC) (,  plus others), Concussion (2023), Female infertility, Hemorrhoids, History of endometrial biopsy (10/2020), History of infertility, History of pelvic ultrasound (10/05/2020), Kidney cysts, Liver, polycystic, Low hemoglobin, Migraine, Polycystic ovary syndrome (mid to late 80's), Skin cancer (melanoma) (HCC), Subarachnoid cyst, and Varicella.  She   Patient Active Problem List    Diagnosis Date Noted   • Liver cyst - NO ESTROGEN 10/20/2022     She  has a past surgical history that includes Hysteroscopy w/ polypectomy (10/2018); Appendectomy (); Gynecologic cryosurgery ();  section; Tonsillectomy; Excision basal cell carcinoma (); Cosmetic surgery (); Sinus surgery (); Tubal ligation (); Excisional hemorrhoidectomy (); Mammo (historical) (Bilateral, 2021); and Colonoscopy (2018).  Her family history includes Breast cancer (age of onset: 58) in her mother; Cancer in her father; Dementia in her maternal grandmother; Diabetes in her father and paternal grandmother; Kidney disease in her father; Learning disabilities in her paternal uncle; No Known Problems in her brother, brother, child, daughter, maternal uncle, maternal uncle, maternal uncle, paternal grandfather, and son; Obesity in her paternal grandmother; Other in her father and mother; Pancreatic cancer in her paternal grandmother; Skin cancer in her maternal grandfather; Stroke in her father.  She  reports that she has never smoked. She has never used smokeless tobacco. She reports current alcohol use of about 3.0 standard drinks of alcohol per week. She reports that she does not use drugs.  Current Outpatient Medications   Medication Sig Dispense Refill   • METRONIDAZOLE, TOPICAL, 0.75 % LOTN APPLY TO FACE ONCE DAILY       No current facility-administered medications for this visit.     She is allergic to bactrim [sulfamethoxazole-trimethoprim] and sulfa  "antibiotics..    Review of Systems  No breast, bladder, bowel changes. No new persistent pain, bloating, early satiety or pelvic pressure  +spotting for the month of July 2024  +temperature instability and mood lability    Objective:    BP 96/62 (BP Location: Left arm, Patient Position: Sitting, Cuff Size: Standard)   Ht 5' 6.75\" (1.695 m)   Wt 62.9 kg (138 lb 9.6 oz)   LMP 09/25/2024 (Approximate)   BMI 21.87 kg/m²      Physical Exam    General appearance: no distress, pleasant  Neck: thyroid without nodules or thyromegaly, no palpable adenopathy  Lymph nodes: no palpable adenopathy  Breasts: no masses, nodes or skin changes  Abdomen: soft, non tender, prominent liver edge as in past, no palpable masses  Pelvic exam: normal external genitalia, urethral meatus normal, vagina without lesions, cervix without lesions, uterus small, non tender, no adnexal masses, non tender  Rectal exam: deferred    "

## 2024-10-23 ENCOUNTER — HOSPITAL ENCOUNTER (OUTPATIENT)
Dept: ULTRASOUND IMAGING | Facility: HOSPITAL | Age: 59
Discharge: HOME | End: 2024-10-23
Attending: OBSTETRICS & GYNECOLOGY
Payer: COMMERCIAL

## 2024-10-23 DIAGNOSIS — N92.6 IRREGULAR BLEEDING: ICD-10-CM

## 2024-10-23 PROCEDURE — 76856 US EXAM PELVIC COMPLETE: CPT

## 2024-10-23 PROCEDURE — 76830 TRANSVAGINAL US NON-OB: CPT

## 2024-10-30 ENCOUNTER — TELEPHONE (OUTPATIENT)
Age: 59
End: 2024-10-30

## 2024-10-30 NOTE — TELEPHONE ENCOUNTER
"Patient called for pelvis ultrasound results. Results per Dr. Lucia provided :     \"AV uterus 7 cm, bulbous, c/w adenomyosis. Fundal uterine simple cyst 5 mm.  EMS 3 mm. R ov 2.2 and L ov 1.8 cm, both WNL. No free fluid.\"    Patient requested [employer] message form Dr. Lucia be read. Message read to patient        \"Hi Sandee! Your ultrasound looks fine with a very thin lining and no need to biopsy unless you have prolonged or heavy bleeding.  Let me know if you have any questions.\"       She offers no questions or concerns at this time.   "

## 2025-08-07 PROBLEM — G25.81 RESTLESS LEGS SYNDROME: Status: ACTIVE | Noted: 2025-08-07

## 2025-08-07 PROBLEM — R20.2: Status: ACTIVE | Noted: 2025-08-07

## 2025-08-07 PROBLEM — Z85.828 HISTORY OF BASAL CELL CARCINOMA: Status: ACTIVE | Noted: 2025-08-07

## 2025-08-07 PROBLEM — F41.9 ANXIETY DISORDER, UNSPECIFIED: Status: ACTIVE | Noted: 2025-08-07

## 2025-08-07 PROBLEM — E61.1 IRON DEFICIENCY: Status: ACTIVE | Noted: 2025-08-07

## 2025-08-13 ENCOUNTER — OFFICE VISIT (OUTPATIENT)
Age: 60
End: 2025-08-13
Payer: COMMERCIAL

## 2025-08-13 ENCOUNTER — TELEPHONE (OUTPATIENT)
Dept: ADMINISTRATIVE | Facility: OTHER | Age: 60
End: 2025-08-13

## 2025-08-14 ENCOUNTER — APPOINTMENT (OUTPATIENT)
Age: 60
End: 2025-08-14
Payer: COMMERCIAL

## 2025-08-27 PROBLEM — F41.9 ANXIETY DISORDER, UNSPECIFIED: Status: RESOLVED | Noted: 2025-08-07 | Resolved: 2025-08-27

## 2025-08-27 PROBLEM — E61.1 IRON DEFICIENCY: Status: RESOLVED | Noted: 2025-08-07 | Resolved: 2025-08-27

## 2025-08-27 PROBLEM — E55.9 VITAMIN D DEFICIENCY: Status: ACTIVE | Noted: 2025-08-27
